# Patient Record
Sex: FEMALE | Race: WHITE | NOT HISPANIC OR LATINO | Employment: PART TIME | URBAN - METROPOLITAN AREA
[De-identification: names, ages, dates, MRNs, and addresses within clinical notes are randomized per-mention and may not be internally consistent; named-entity substitution may affect disease eponyms.]

---

## 2019-09-06 ENCOUNTER — HOSPITAL ENCOUNTER (OUTPATIENT)
Dept: OTHER | Facility: HOSPITAL | Age: 24
Discharge: HOME | End: 2019-09-06
Attending: OBSTETRICS & GYNECOLOGY

## 2021-10-15 ENCOUNTER — TELEPHONE (OUTPATIENT)
Dept: OBSTETRICS AND GYNECOLOGY | Facility: CLINIC | Age: 26
End: 2021-10-15

## 2021-10-20 RX ORDER — SERTRALINE HYDROCHLORIDE 100 MG/1
TABLET, FILM COATED ORAL
Qty: 30 TABLET | Refills: 1 | Status: SHIPPED | OUTPATIENT
Start: 2021-10-20 | End: 2021-11-01

## 2021-10-20 NOTE — TELEPHONE ENCOUNTER
Rx Refill Note  Requested Prescriptions     Pending Prescriptions Disp Refills   • sertraline (ZOLOFT) 100 MG tablet [Pharmacy Med Name: SERTRALINE  MG TABLET] 30 tablet      Sig: TAKE ONE TABLET BY MOUTH DAILY      Last office visit with prescribing clinician: PATIENT LAST SEEN ON 9-30-20 RX GIVEN FOR ZOLOFT 100MG WITH 11 REFILLS @ THAT TIME CHART SCANNED found      Next office visit with prescribing clinician: PAUL 11/1/2021            Padmini Larkin MA  10/20/21, 09:01 EDT

## 2021-11-01 ENCOUNTER — OFFICE VISIT (OUTPATIENT)
Dept: OBSTETRICS AND GYNECOLOGY | Facility: CLINIC | Age: 26
End: 2021-11-01

## 2021-11-01 VITALS
BODY MASS INDEX: 48.9 KG/M2 | WEIGHT: 276 LBS | SYSTOLIC BLOOD PRESSURE: 118 MMHG | HEART RATE: 96 BPM | DIASTOLIC BLOOD PRESSURE: 79 MMHG | HEIGHT: 63 IN

## 2021-11-01 DIAGNOSIS — Z01.419 WOMEN'S ANNUAL ROUTINE GYNECOLOGICAL EXAMINATION: Primary | ICD-10-CM

## 2021-11-01 DIAGNOSIS — E66.01 MORBID OBESITY WITH BMI OF 45.0-49.9, ADULT (HCC): ICD-10-CM

## 2021-11-01 DIAGNOSIS — F32.89 OTHER DEPRESSION: ICD-10-CM

## 2021-11-01 DIAGNOSIS — F52.0 HYPOACTIVE SEXUAL DESIRE: ICD-10-CM

## 2021-11-01 PROBLEM — F32.A DEPRESSION: Status: ACTIVE | Noted: 2021-11-01

## 2021-11-01 PROCEDURE — 99395 PREV VISIT EST AGE 18-39: CPT | Performed by: OBSTETRICS & GYNECOLOGY

## 2021-11-01 PROCEDURE — G0123 SCREEN CERV/VAG THIN LAYER: HCPCS | Performed by: OBSTETRICS & GYNECOLOGY

## 2021-11-01 RX ORDER — SERTRALINE HYDROCHLORIDE 100 MG/1
TABLET, FILM COATED ORAL
COMMUNITY
End: 2021-11-01

## 2021-11-01 RX ORDER — SERTRALINE HYDROCHLORIDE 100 MG/1
100 TABLET, FILM COATED ORAL DAILY
Qty: 30 TABLET | Refills: 12 | Status: SHIPPED | OUTPATIENT
Start: 2021-11-01 | End: 2023-01-10 | Stop reason: SDUPTHER

## 2021-11-01 NOTE — PROGRESS NOTES
"Well Woman Visit    CC: WWE       Tobacco/Nicotine use:  No    HPI:   26 y.o. who presents for a WWE. She reports a monthly menses lasting about 5 days. Moderate flow. Mild dysmenorrhea. Her depression is stable and doing well with the zoloft. She does complain of decreased libido.      History: PMHx, Meds, Allergies, PSHx, Social Hx, and POBHx all reviewed and updated.  PCP: does manage PMHx and preventative labs      /79   Pulse 96   Ht 160 cm (63\")   Wt 125 kg (276 lb)   BMI 48.89 kg/m²     Physical Exam  Vitals and nursing note reviewed. Exam conducted with a chaperone present.   Constitutional:       General: She is not in acute distress.     Appearance: Normal appearance. She is obese. She is not ill-appearing.   HENT:      Head: Normocephalic and atraumatic.      Mouth/Throat:      Mouth: Mucous membranes are moist.      Pharynx: Oropharynx is clear.   Eyes:      Extraocular Movements: Extraocular movements intact.   Neck:      Thyroid: No thyroid mass or thyromegaly.   Cardiovascular:      Rate and Rhythm: Normal rate and regular rhythm.      Heart sounds: Normal heart sounds. No murmur heard.      Pulmonary:      Effort: Pulmonary effort is normal. No respiratory distress.      Breath sounds: Normal breath sounds. No wheezing.   Chest:   Breasts:      Right: No mass, nipple discharge or tenderness.      Left: No mass, nipple discharge or tenderness.       Abdominal:      General: There is no distension.      Palpations: Abdomen is soft. There is no mass.      Tenderness: There is no abdominal tenderness. There is no guarding or rebound.      Hernia: No hernia is present. There is no hernia in the left inguinal area or right inguinal area.   Genitourinary:     General: Normal vulva.      Exam position: Lithotomy position.      Pubic Area: No rash.       Labia:         Right: No rash, tenderness, lesion or injury.         Left: No rash, tenderness, lesion or injury.       Urethra: No " prolapse, urethral pain, urethral swelling or urethral lesion.      Vagina: Normal. No signs of injury and foreign body. No vaginal discharge, erythema, tenderness, bleeding, lesions or prolapsed vaginal walls.      Cervix: Normal.      Uterus: Normal. Not deviated, not enlarged, not fixed, not tender and no uterine prolapse.       Adnexa: Right adnexa normal and left adnexa normal.        Right: No mass, tenderness or fullness.          Left: No mass, tenderness or fullness.        Comments: Exam is limited by body habitus  Musculoskeletal:      Cervical back: Neck supple. No tenderness.   Lymphadenopathy:      Lower Body: No right inguinal adenopathy. No left inguinal adenopathy.   Skin:     General: Skin is warm and dry.   Neurological:      Mental Status: She is alert and oriented to person, place, and time.   Psychiatric:         Mood and Affect: Mood normal.         Behavior: Behavior normal.         Thought Content: Thought content normal.         ASSESSMENT AND PLAN:      Diagnoses and all orders for this visit:    1. Women's annual routine gynecological examination (Primary)  -     IGP,rfx Aptima HPV All Pth    2. Other depression  Assessment & Plan:  Symptoms stable and controlled  Continue zoloft, but this may be contributing to decreased sexual desire  Consider weaning to lowest dose possible    Orders:  -     sertraline (Zoloft) 100 MG tablet; Take 1 tablet by mouth Daily.  Dispense: 30 tablet; Refill: 12    3. Hypoactive sexual desire  Assessment & Plan:  Discussed etiologies  Recommended trying to wean zoloft to lowest dose possible  Discussed pharmacologic treatment options      4. Morbid obesity with BMI of 45.0-49.9, adult ([facility])  Assessment & Plan:  Recommended exercise, diet and weight loss          Preventative:     Recommend FLU vaccine this season, R/B discussed  s/p COVID vaccine      She understands the importance of having any ordered tests to be performed in a timely fashion.  The risks of  not performing them include, but are not limited to, advanced cancer stages, bone loss from osteoporosis and/or subsequent increase in morbidity and/or mortality.  She is encouraged to review her results online and/or contact or office if she has questions.     Follow Up:  Return for Annual physical.      Costa Phillips MD  11/01/2021

## 2021-11-02 NOTE — ASSESSMENT & PLAN NOTE
Discussed etiologies  Recommended trying to wean zoloft to lowest dose possible  Discussed pharmacologic treatment options

## 2021-11-02 NOTE — ASSESSMENT & PLAN NOTE
Symptoms stable and controlled  Continue zoloft, but this may be contributing to decreased sexual desire  Consider weaning to lowest dose possible

## 2021-11-04 LAB
CONV .: NORMAL
CYTOLOGIST CVX/VAG CYTO: NORMAL
CYTOLOGY CVX/VAG DOC CYTO: NORMAL
CYTOLOGY CVX/VAG DOC THIN PREP: NORMAL
DX ICD CODE: NORMAL
HIV 1 & 2 AB SER-IMP: NORMAL
OTHER STN SPEC: NORMAL
STAT OF ADQ CVX/VAG CYTO-IMP: NORMAL

## 2022-12-30 DIAGNOSIS — F32.89 OTHER DEPRESSION: ICD-10-CM

## 2023-01-03 RX ORDER — SERTRALINE HYDROCHLORIDE 100 MG/1
TABLET, FILM COATED ORAL
Qty: 30 TABLET | Refills: 0 | OUTPATIENT
Start: 2023-01-03

## 2023-01-03 NOTE — TELEPHONE ENCOUNTER
Rx Refill Note  Requested Prescriptions     Pending Prescriptions Disp Refills   • sertraline (ZOLOFT) 100 MG tablet [Pharmacy Med Name: SERTRALINE  MG TABLET] 60 tablet      Sig: TAKE ONE TABLET BY MOUTH DAILY      Last seen 11-1-21.  Last filled 11-1-21 Zoloft # 30 with 12 refills  No appointment scheduled                         Would you like a call back once the refill request has been completed: [] Yes [] No    If the office needs to give you a call back, can they leave a voicemail: [] Yes [] No    Padmini Larkin MA  01/03/23, 09:17 EST

## 2023-01-10 DIAGNOSIS — F32.89 OTHER DEPRESSION: ICD-10-CM

## 2023-01-10 RX ORDER — SERTRALINE HYDROCHLORIDE 100 MG/1
100 TABLET, FILM COATED ORAL DAILY
Qty: 30 TABLET | Refills: 0 | Status: SHIPPED | OUTPATIENT
Start: 2023-01-10 | End: 2023-01-31

## 2023-01-10 NOTE — TELEPHONE ENCOUNTER
Rx Refill Note  Requested Prescriptions     Pending Prescriptions Disp Refills   • sertraline (Zoloft) 100 MG tablet 30 tablet 12     Sig: Take 1 tablet by mouth Daily.      Last seen 11-1-21.  Last filled 11-1-21 Zoloft #30 with 12 refills     Next appointment 1-30-23                          Would you like a call back once the refill request has been completed: [] Yes [] No    If the office needs to give you a call back, can they leave a voicemail: [] Yes [] No    Padmini Larkin MA  01/10/23, 11:16 EST

## 2023-01-26 ENCOUNTER — TELEPHONE (OUTPATIENT)
Dept: OBSTETRICS AND GYNECOLOGY | Facility: CLINIC | Age: 28
End: 2023-01-26
Payer: COMMERCIAL

## 2023-01-26 DIAGNOSIS — F32.89 OTHER DEPRESSION: ICD-10-CM

## 2023-01-26 NOTE — TELEPHONE ENCOUNTER
Provider: DR. GTZ  Caller: =CASEY LÓPEZ  Relationship to Patient: SELF  Pharmacy: [facility] @ [address][phone]  Phone Number: [phone]  Reason for Call: PT WILL BE OUT OF HER ZOLOFT ON 2-10. HAD TO R/S APPT TO 2-28 THAT SHE HAD ON 1/26 DUE TO PROVIDER BEING OUT OF OFFICE.  COULD SHE PLS GET A REFILL TO HOLD HER UNTIL HER 2-28 APPT?  ANY QUESTIONS PLS CONTACT HER    When was the patient last seen: 11-1-21

## 2023-01-30 RX ORDER — SERTRALINE HYDROCHLORIDE 100 MG/1
100 TABLET, FILM COATED ORAL DAILY
Qty: 30 TABLET | Refills: 0 | Status: CANCELLED | OUTPATIENT
Start: 2023-01-30

## 2023-01-30 NOTE — TELEPHONE ENCOUNTER
Patient talked to someone @ [facility].  I have attached her note.  She is requesting a refill on her Zoloft.  Last filled 1/10/23 Zoloft #30 with no refills.  Last seen 11/1/21.  Next appointment 2/28/23

## 2023-01-31 DIAGNOSIS — F33.0 MILD EPISODE OF RECURRENT MAJOR DEPRESSIVE DISORDER: Primary | ICD-10-CM

## 2023-01-31 RX ORDER — SERTRALINE HYDROCHLORIDE 100 MG/1
100 TABLET, FILM COATED ORAL DAILY
Qty: 90 TABLET | Refills: 3 | Status: SHIPPED | OUTPATIENT
Start: 2023-01-31

## 2023-02-28 ENCOUNTER — OFFICE VISIT (OUTPATIENT)
Dept: OBSTETRICS AND GYNECOLOGY | Facility: CLINIC | Age: 28
End: 2023-02-28
Payer: COMMERCIAL

## 2023-02-28 VITALS
BODY MASS INDEX: 48.18 KG/M2 | HEART RATE: 97 BPM | SYSTOLIC BLOOD PRESSURE: 104 MMHG | DIASTOLIC BLOOD PRESSURE: 72 MMHG | WEIGHT: 272 LBS

## 2023-02-28 DIAGNOSIS — L68.0 FEMALE HIRSUTISM: ICD-10-CM

## 2023-02-28 DIAGNOSIS — Z01.419 WELL WOMAN EXAM WITH ROUTINE GYNECOLOGICAL EXAM: Primary | ICD-10-CM

## 2023-02-28 PROBLEM — F52.0 HYPOACTIVE SEXUAL DESIRE: Status: RESOLVED | Noted: 2021-11-01 | Resolved: 2023-02-28

## 2023-02-28 LAB
ESTRADIOL SERPL HS-MCNC: 48 PG/ML
T4 FREE SERPL-MCNC: 1.21 NG/DL (ref 0.93–1.7)
TESTOST SERPL-MCNC: 40.2 NG/DL (ref 8.4–48.1)
TSH SERPL DL<=0.05 MIU/L-ACNC: 1.45 UIU/ML (ref 0.27–4.2)

## 2023-02-28 PROCEDURE — 84410 TESTOSTERONE BIOAVAILABLE: CPT | Performed by: OBSTETRICS & GYNECOLOGY

## 2023-02-28 PROCEDURE — 84439 ASSAY OF FREE THYROXINE: CPT | Performed by: OBSTETRICS & GYNECOLOGY

## 2023-02-28 PROCEDURE — G0123 SCREEN CERV/VAG THIN LAYER: HCPCS | Performed by: OBSTETRICS & GYNECOLOGY

## 2023-02-28 PROCEDURE — 84403 ASSAY OF TOTAL TESTOSTERONE: CPT | Performed by: OBSTETRICS & GYNECOLOGY

## 2023-02-28 PROCEDURE — 84443 ASSAY THYROID STIM HORMONE: CPT | Performed by: OBSTETRICS & GYNECOLOGY

## 2023-02-28 PROCEDURE — 82670 ASSAY OF TOTAL ESTRADIOL: CPT | Performed by: OBSTETRICS & GYNECOLOGY

## 2023-02-28 PROCEDURE — 99395 PREV VISIT EST AGE 18-39: CPT | Performed by: OBSTETRICS & GYNECOLOGY

## 2023-02-28 RX ORDER — SPIRONOLACTONE 50 MG/1
50 TABLET, FILM COATED ORAL DAILY
Qty: 30 TABLET | Refills: 1 | Status: SHIPPED | OUTPATIENT
Start: 2023-02-28

## 2023-02-28 NOTE — PROGRESS NOTES
Well Woman Visit    CC: PAUL    HPI:   27 y.o. who presents for a well woman exam. Menses is regular.  Has noticed that her menses has changed.  Last about 5 to 6 days.  Has a couple of days in the middle where she does not have bleeding and then has spotting afterwards.  Also complains of excess facial hair growth and increased sweating since delivery of her last child.  No other problems.    History: PMHx, Meds, Allergies, PSHx, Social Hx, and POBHx all reviewed and updated.    /72   Pulse 97   Wt 123 kg (272 lb)   LMP 2023   Breastfeeding No   BMI 48.18 kg/m²     Physical Exam  Vitals and nursing note reviewed. Exam conducted with a chaperone present.   Constitutional:       General: She is not in acute distress.     Appearance: Normal appearance. She is obese. She is not ill-appearing.   HENT:      Head: Normocephalic and atraumatic.   Eyes:      Extraocular Movements: Extraocular movements intact.   Neck:      Thyroid: No thyroid mass or thyromegaly.   Chest:   Breasts:     Breasts are symmetrical.      Right: Normal. No swelling, bleeding, inverted nipple, mass, nipple discharge, skin change or tenderness.      Left: Normal. No swelling, bleeding, inverted nipple, mass, nipple discharge, skin change or tenderness.   Abdominal:      General: Abdomen is flat. There is no distension.      Palpations: Abdomen is soft. There is no mass.      Tenderness: There is no abdominal tenderness. There is no guarding or rebound.      Hernia: No hernia is present. There is no hernia in the left inguinal area or right inguinal area.   Genitourinary:     General: Normal vulva.      Exam position: Knee-chest position.      Pubic Area: No rash.       Labia:         Right: No rash or lesion.         Left: No rash or lesion.       Urethra: No prolapse, urethral pain, urethral swelling or urethral lesion.      Vagina: Normal. No vaginal discharge, tenderness or prolapsed vaginal walls.      Cervix: Normal.       Uterus: Normal.       Adnexa: Right adnexa normal and left adnexa normal.      Comments: The patient's exam is limited by body habitus  Musculoskeletal:      Right lower leg: No edema.      Left lower leg: No edema.   Lymphadenopathy:      Upper Body:      Right upper body: No supraclavicular or axillary adenopathy.      Left upper body: No supraclavicular or axillary adenopathy.   Skin:     General: Skin is warm and dry.      Findings: No rash.   Neurological:      Mental Status: She is alert and oriented to person, place, and time.   Psychiatric:         Mood and Affect: Mood normal.         Behavior: Behavior normal.         Thought Content: Thought content normal.         Judgment: Judgment normal.         ASSESSMENT AND PLAN:    Diagnoses and all orders for this visit:    1. Well woman exam with routine gynecological exam (Primary)  Assessment & Plan:  Pap  Commend daily multivitamin with folic acid    Orders:  -     IGP,rfx Aptima HPV All Pth    2. Female hirsutism  -     Testosterone, Bioavailable (M)  -     Testosterone - total; Future  -     TSH  -     T4, free  -     Estradiol  -     spironolactone (Aldactone) 50 MG tablet; Take 1 tablet by mouth Daily.  Dispense: 30 tablet; Refill: 1  -     Testosterone - total      Counseling:     Track menses, RTO IF <q21d, >7d long, or heavy    Preventative:   Recommend FLU vaccine this season, R/B discussed  s/p COVID vaccine      She understands the importance of having any ordered tests to be performed in a timely fashion.  The risks of not performing them include, but are not limited to, advanced cancer stages, bone loss from osteoporosis and/or subsequent increase in morbidity and/or mortality.  She is encouraged to review her results online and/or contact or office if she has questions.     Follow Up:  Return in about 4 weeks (around 3/28/2023) for Recheck.    Costa Phillips MD  02/28/2023

## 2023-03-01 ENCOUNTER — TELEPHONE (OUTPATIENT)
Dept: OBSTETRICS AND GYNECOLOGY | Facility: CLINIC | Age: 28
End: 2023-03-01
Payer: COMMERCIAL

## 2023-03-01 NOTE — TELEPHONE ENCOUNTER
----- Message from Costa Phillips MD sent at 3/1/2023  7:47 AM EST -----  These inform the patient the labs that have returned so far are normal.  I am still waiting for the testosterone levels.

## 2023-03-09 LAB
TESTOST SERPL-MCNC: 28 NG/DL
TESTOSTERONE.FREE+WB MFR SERPL: 40.6 %
TESTOSTERONE.FREE+WB SERPL-MCNC: 11 NG/DL

## 2023-03-28 ENCOUNTER — TELEPHONE (OUTPATIENT)
Dept: OBSTETRICS AND GYNECOLOGY | Facility: CLINIC | Age: 28
End: 2023-03-28

## 2023-03-28 NOTE — TELEPHONE ENCOUNTER
DELETE AFTER REVIEWING: Telephone encounter to be sent to the clinical pool     Caller: Karma Arriola    Relationship to patient: Self    Best call back number: [phone]    PT WAS SAME DAY CANCEL TODAY DUE TO ILLNESS.  WE RESCHEDULED 1ST AVAIL ON 5-9, THIS APPT IS FOR A MED RECHECK (4 WK FROM 2-28)). SHE STATED THE MED IS FINE BUT THEY WERE GOING TO DISCUSS POSS INCREASING THE DOSAGE.   CAN CALL PT TO R/S IF NEEDED. CAN CALL HER ANYTIME AND CAN LVM IF NA.

## 2023-05-09 ENCOUNTER — OFFICE VISIT (OUTPATIENT)
Dept: OBSTETRICS AND GYNECOLOGY | Facility: CLINIC | Age: 28
End: 2023-05-09
Payer: COMMERCIAL

## 2023-05-09 VITALS
BODY MASS INDEX: 49.07 KG/M2 | WEIGHT: 277 LBS | DIASTOLIC BLOOD PRESSURE: 75 MMHG | SYSTOLIC BLOOD PRESSURE: 109 MMHG | HEART RATE: 84 BPM

## 2023-05-09 DIAGNOSIS — R10.2 PELVIC PAIN: ICD-10-CM

## 2023-05-09 DIAGNOSIS — L68.0 FEMALE HIRSUTISM: Primary | ICD-10-CM

## 2023-05-09 PROBLEM — Z01.419 WELL WOMAN EXAM WITH ROUTINE GYNECOLOGICAL EXAM: Status: RESOLVED | Noted: 2023-02-28 | Resolved: 2023-05-09

## 2023-05-09 PROCEDURE — 99213 OFFICE O/P EST LOW 20 MIN: CPT | Performed by: OBSTETRICS & GYNECOLOGY

## 2023-05-09 RX ORDER — SPIRONOLACTONE 100 MG/1
100 TABLET, FILM COATED ORAL DAILY
Qty: 30 TABLET | Refills: 3 | Status: SHIPPED | OUTPATIENT
Start: 2023-05-09

## 2023-05-09 NOTE — PROGRESS NOTES
GYN Visit    CC: Follow-up meds    HPI:   27 y.o. who presents in follow-up of spironolactone for hirsutism.  The patient's had no medication problems.  She had no adverse side effects.  She has not noticed any significant new hair growth.  She wishes to continue with spironolactone.  Patient also notes that she has had 3 years of off-and-on right lower pelvic pain.  The pain occurs randomly.  Not related to her menses.  Sometimes is months between episodes sometimes it occurs several times in a week.  Usually last a few minutes to couple of hours and then will resolve.  Started after her last pregnancy.    History: PMHx, Meds, Allergies, PSHx, Social Hx, and POBHx all reviewed and updated.    /75   Pulse 84   Wt 126 kg (277 lb)   LMP 2023 Comment: Cycle ended on the  23, normal flow  Breastfeeding No   BMI 49.07 kg/m²     Physical Exam  Vitals and nursing note reviewed. Exam conducted with a chaperone present.   Constitutional:       General: She is not in acute distress.     Appearance: Normal appearance. She is obese. She is not ill-appearing.   Musculoskeletal:         General: No swelling.      Right lower leg: No edema.      Left lower leg: No edema.   Neurological:      Mental Status: She is alert and oriented to person, place, and time.   Psychiatric:         Mood and Affect: Mood normal.         Behavior: Behavior normal.         Thought Content: Thought content normal.         Judgment: Judgment normal.           ASSESSMENT AND PLAN:  Diagnoses and all orders for this visit:    1. Female hirsutism (Primary)  Assessment & Plan:  BPs seems to be tolerating spironolactone well.  Increase to 100 mg daily.    Orders:  -     spironolactone (Aldactone) 100 MG tablet; Take 1 tablet by mouth Daily.  Dispense: 30 tablet; Refill: 3    2. Pelvic pain  Assessment & Plan:  Check pelvic ultrasound    Orders:  -     Cancel: US Pelvis Transvaginal Non OB; Future  -     US Pelvis Transvaginal Non  OB; Future      Follow Up:  Return in about 4 weeks (around 6/6/2023) for Recheck.    Costa Phillips MD  05/09/2023

## 2023-06-07 ENCOUNTER — OFFICE VISIT (OUTPATIENT)
Dept: OBSTETRICS AND GYNECOLOGY | Facility: CLINIC | Age: 28
End: 2023-06-07
Payer: COMMERCIAL

## 2023-06-07 VITALS
HEART RATE: 80 BPM | SYSTOLIC BLOOD PRESSURE: 102 MMHG | DIASTOLIC BLOOD PRESSURE: 54 MMHG | WEIGHT: 275 LBS | HEIGHT: 63 IN | BODY MASS INDEX: 48.73 KG/M2

## 2023-06-07 DIAGNOSIS — E66.01 MORBID OBESITY WITH BMI OF 45.0-49.9, ADULT: ICD-10-CM

## 2023-06-07 DIAGNOSIS — L68.0 FEMALE HIRSUTISM: ICD-10-CM

## 2023-06-07 DIAGNOSIS — R10.2 PELVIC PAIN: Primary | ICD-10-CM

## 2023-06-07 PROCEDURE — 99213 OFFICE O/P EST LOW 20 MIN: CPT | Performed by: OBSTETRICS & GYNECOLOGY

## 2023-06-07 NOTE — ASSESSMENT & PLAN NOTE
The patient's pelvic ultrasound is essentially normal.  There are some immature follicles noted on the ultrasound but not enough that actually meets PCOS criteria.  The pattern is suspicious for PCOS particular given the patient's issues with hirsutism.  The patient does have regular cycles however.  In regards the patient's pelvic pain there is no significant findings.  There is possibly a small amount of pelvic congestion however it is uncertain if this is clinically relatable or to the patient's symptoms or not.  The patient really does not have chronic pain more of intermittent acute episodes of pain.  Patient wishes to monitor symptoms at this time.  She can control her symptoms with NSAIDs and then return if necessary.

## 2023-06-07 NOTE — ASSESSMENT & PLAN NOTE
BP is 102/54.  I do not think we can titrate the spironolactone any further.  We will leave the dose at 100 mg.  She will continue the 100 mg daily.

## 2023-06-07 NOTE — PROGRESS NOTES
"GYN Visit    CC: Follow-up ultrasound    HPI:   27 y.o. who presents in follow-up of an ultrasound in regards to pelvic pain.  The patient reports that her pain is intermittent.  Sharp and only lasted a short period of time.  She is wanted to be sure there was not anything significant going on.  She is doing well with the spironolactone.  She is having no problems.      History: PMHx, Meds, Allergies, PSHx, Social Hx, and POBHx all reviewed and updated.    /54   Pulse 80   Ht 160 cm (63\")   Wt 125 kg (275 lb)   BMI 48.71 kg/m²     Physical Exam  Vitals and nursing note reviewed. Exam conducted with a chaperone present.   Constitutional:       General: She is not in acute distress.     Appearance: Normal appearance. She is not ill-appearing.   Neurological:      Mental Status: She is alert and oriented to person, place, and time.   Psychiatric:         Mood and Affect: Mood normal.         Behavior: Behavior normal.         Thought Content: Thought content normal.         Judgment: Judgment normal.         ASSESSMENT AND PLAN:  Diagnoses and all orders for this visit:    1. Pelvic pain (Primary)  Assessment & Plan:  The patient's pelvic ultrasound is essentially normal.  There are some immature follicles noted on the ultrasound but not enough that actually meets PCOS criteria.  The pattern is suspicious for PCOS particular given the patient's issues with hirsutism.  The patient does have regular cycles however.  In regards the patient's pelvic pain there is no significant findings.  There is possibly a small amount of pelvic congestion however it is uncertain if this is clinically relatable or to the patient's symptoms or not.  The patient really does not have chronic pain more of intermittent acute episodes of pain.  Patient wishes to monitor symptoms at this time.  She can control her symptoms with NSAIDs and then return if necessary.      2. Morbid obesity with BMI of 45.0-49.9, adult  Assessment " & Plan:  Discussed exercise, nutrition and recommended weight loss.      3. Female hirsutism  Assessment & Plan:  BP is 102/54.  I do not think we can titrate the spironolactone any further.  We will leave the dose at 100 mg.  She will continue the 100 mg daily.      Follow Up:  Return for Annual physical.      Costa Phillips MD  06/07/2023

## 2024-02-27 DIAGNOSIS — F33.0 MILD EPISODE OF RECURRENT MAJOR DEPRESSIVE DISORDER: ICD-10-CM

## 2024-02-27 RX ORDER — SERTRALINE HYDROCHLORIDE 100 MG/1
100 TABLET, FILM COATED ORAL DAILY
Qty: 90 TABLET | Refills: 3 | Status: SHIPPED | OUTPATIENT
Start: 2024-02-27

## 2024-02-27 NOTE — TELEPHONE ENCOUNTER
Pharmacy requesting refill on Zoloft 100mg.  Last seen 2/23/23 annual.  Last filled 1/31/23 Zoloft #90 with 3 refills.  Next appointment 3/4/24

## 2024-03-04 ENCOUNTER — OFFICE VISIT (OUTPATIENT)
Dept: OBSTETRICS AND GYNECOLOGY | Facility: CLINIC | Age: 29
End: 2024-03-04
Payer: COMMERCIAL

## 2024-03-04 VITALS
SYSTOLIC BLOOD PRESSURE: 99 MMHG | WEIGHT: 276 LBS | DIASTOLIC BLOOD PRESSURE: 69 MMHG | BODY MASS INDEX: 48.9 KG/M2 | HEIGHT: 63 IN | HEART RATE: 92 BPM

## 2024-03-04 DIAGNOSIS — F32.89 OTHER DEPRESSION: ICD-10-CM

## 2024-03-04 DIAGNOSIS — Z01.419 WELL WOMAN EXAM WITH ROUTINE GYNECOLOGICAL EXAM: Primary | ICD-10-CM

## 2024-03-04 PROBLEM — R10.2 PELVIC PAIN: Status: RESOLVED | Noted: 2023-05-09 | Resolved: 2024-03-04

## 2024-03-04 PROCEDURE — G0123 SCREEN CERV/VAG THIN LAYER: HCPCS | Performed by: OBSTETRICS & GYNECOLOGY

## 2024-03-04 PROCEDURE — 99395 PREV VISIT EST AGE 18-39: CPT | Performed by: OBSTETRICS & GYNECOLOGY

## 2024-03-04 NOTE — PROGRESS NOTES
"Well Woman Visit    CC: PAUL     HPI:   28 y.o. who presents for a well woman exam.  Patient has no complaints or concerns today.  She reports her menstrual cycles are fairly regular.  Her partner had a vasectomy for contraception.  She is no longer using the spironolactone.  She is still taking the Zoloft 100 mg daily and doing well with this.    History: PMHx, Meds, Allergies, PSHx, Social Hx, and POBHx all reviewed and updated.      BP 99/69   Pulse 92   Ht 160 cm (63\")   Wt 125 kg (276 lb)   LMP 2024   Breastfeeding No   BMI 48.89 kg/m²     Physical Exam  Vitals and nursing note reviewed. Exam conducted with a chaperone present.   Constitutional:       General: She is not in acute distress.     Appearance: Normal appearance. She is obese. She is not ill-appearing.   HENT:      Head: Normocephalic and atraumatic.   Neck:      Thyroid: No thyroid mass or thyromegaly.   Chest:   Breasts:     Breasts are symmetrical.      Right: Normal. No swelling, bleeding, inverted nipple, mass, nipple discharge, skin change or tenderness.      Left: Normal. No swelling, bleeding, inverted nipple, mass, nipple discharge, skin change or tenderness.   Abdominal:      General: There is no distension.      Palpations: Abdomen is soft. There is no mass.      Tenderness: There is no abdominal tenderness.      Hernia: There is no hernia in the right inguinal area.   Genitourinary:     General: Normal vulva.      Exam position: Lithotomy position.      Pubic Area: No rash.       Labia:         Right: No rash, tenderness, lesion or injury.         Left: No rash, tenderness, lesion or injury.       Urethra: No prolapse, urethral pain or urethral lesion.      Vagina: Normal. No vaginal discharge, erythema, tenderness, bleeding or prolapsed vaginal walls.      Cervix: Normal.      Uterus: Normal.       Adnexa:         Right: No mass or tenderness.          Left: No mass or tenderness.     Lymphadenopathy:      Upper Body: "      Right upper body: No supraclavicular or axillary adenopathy.      Left upper body: No supraclavicular or axillary adenopathy.   Skin:     General: Skin is warm and dry.   Neurological:      Mental Status: She is alert and oriented to person, place, and time.   Psychiatric:         Mood and Affect: Mood normal.         Behavior: Behavior normal.         Thought Content: Thought content normal.         ASSESSMENT AND PLAN:    Diagnoses and all orders for this visit:    1. Well woman exam with routine gynecological exam (Primary)  Assessment & Plan:  Pap  Recommend daily multivitamin with folic acid    Orders:  -     IGP,rfx Aptima HPV All Pth    2. Other depression  Assessment & Plan:  Stable.  Continue Zoloft 100 mg p.o. daily          Counseling:     Track menses, RTO IF <q21d, >7d long, or heavy    Preventative:   Recommend FLU vaccine this season, R/B discussed  s/p COVID vaccine  COVID booster recommended    She understands the importance of having any ordered tests to be performed in a timely fashion.  The risks of not performing them include, but are not limited to, advanced cancer stages, bone loss from osteoporosis and/or subsequent increase in morbidity and/or mortality.  She is encouraged to review her results online and/or contact or office if she has questions.     Follow Up:  Return for Annual physical.    Costa Phillips MD  03/04/2024

## 2024-03-11 LAB
CONV .: NORMAL
CYTOLOGIST CVX/VAG CYTO: NORMAL
CYTOLOGY CVX/VAG DOC CYTO: NORMAL
CYTOLOGY CVX/VAG DOC THIN PREP: NORMAL
DX ICD CODE: NORMAL
Lab: NORMAL
OTHER STN SPEC: NORMAL
PATHOLOGIST CVX/VAG CYTO: NORMAL
STAT OF ADQ CVX/VAG CYTO-IMP: NORMAL

## 2025-03-10 ENCOUNTER — OFFICE VISIT (OUTPATIENT)
Dept: OBSTETRICS AND GYNECOLOGY | Facility: CLINIC | Age: 30
End: 2025-03-10
Payer: COMMERCIAL

## 2025-03-10 VITALS
SYSTOLIC BLOOD PRESSURE: 99 MMHG | WEIGHT: 282 LBS | BODY MASS INDEX: 49.96 KG/M2 | HEIGHT: 63 IN | DIASTOLIC BLOOD PRESSURE: 64 MMHG | HEART RATE: 81 BPM

## 2025-03-10 DIAGNOSIS — Z01.419 WELL WOMAN EXAM WITH ROUTINE GYNECOLOGICAL EXAM: Primary | ICD-10-CM

## 2025-03-10 DIAGNOSIS — Z30.09 ENCOUNTER FOR OTHER GENERAL COUNSELING OR ADVICE ON CONTRACEPTION: ICD-10-CM

## 2025-03-10 PROBLEM — Z30.9 CONTRACEPTIVE MANAGEMENT: Status: ACTIVE | Noted: 2025-03-10

## 2025-03-10 PROCEDURE — G0123 SCREEN CERV/VAG THIN LAYER: HCPCS | Performed by: OBSTETRICS & GYNECOLOGY

## 2025-03-10 RX ORDER — PANTOPRAZOLE SODIUM 40 MG/1
TABLET, DELAYED RELEASE ORAL
COMMUNITY
Start: 2024-11-01

## 2025-03-10 RX ORDER — ESCITALOPRAM OXALATE 20 MG/1
TABLET ORAL
COMMUNITY
Start: 2024-08-01

## 2025-03-10 NOTE — PROGRESS NOTES
"Well Woman Visit    CC: Well woman visit    Subjective:   29 y.o. who presents for a well woman exam.  The patient has no complaints.  She is interested in potentially using a backup form of contraception in addition to her partner's vasectomy.  Her menses are monthly.  They last about 5 to 7 days with moderate flow.    Last PAP:   Last Completed Pap Smear            Awaiting Completion       PAP SMEAR (Every 3 Years) Order placed this encounter      03/10/2025  Order placed for IGP,rfx Aptima HPV All Pth by Costa Phillips MD    2024  IGP,rfx Aptima HPV All Pth    2023  IGP,rfx Aptima HPV All Pth    2021  IGP,rfx Aptima HPV All Pth                          Last MMG:   Last Completed Mammogram    This patient has no relevant Health Maintenance data.          History: PMHx, Meds, Allergies, PSHx, Social Hx, and POBHx all reviewed and updated.    BP 99/64   Pulse 81   Ht 160 cm (63\")   Wt 128 kg (282 lb)   LMP 2025   Breastfeeding No   BMI 49.95 kg/m²     Physical Exam  Vitals and nursing note reviewed. Exam conducted with a chaperone present.   Constitutional:       General: She is not in acute distress.     Appearance: Normal appearance. She is not ill-appearing.   HENT:      Head: Normocephalic and atraumatic.      Mouth/Throat:      Mouth: Mucous membranes are moist.      Pharynx: Oropharynx is clear.   Eyes:      Extraocular Movements: Extraocular movements intact.   Neck:      Thyroid: No thyroid mass or thyromegaly.   Chest:   Breasts:     Breasts are symmetrical.      Right: Normal. No swelling, bleeding, inverted nipple, mass, nipple discharge, skin change or tenderness.      Left: Normal. No swelling, bleeding, inverted nipple, mass, nipple discharge, skin change or tenderness.   Abdominal:      General: There is no distension.      Palpations: Abdomen is soft. There is no mass.      Tenderness: There is no abdominal tenderness.      Hernia: There is no hernia in " the left inguinal area or right inguinal area.   Genitourinary:     General: Normal vulva.      Exam position: Lithotomy position.      Pubic Area: No rash.       Labia:         Right: No rash, tenderness, lesion or injury.         Left: No rash, tenderness, lesion or injury.       Urethra: No prolapse, urethral pain or urethral lesion.      Vagina: Normal. No vaginal discharge, erythema, tenderness, bleeding or prolapsed vaginal walls.      Cervix: Normal. No friability, lesion, erythema or cervical bleeding.      Uterus: Normal. Not enlarged, not fixed, not tender and no uterine prolapse.       Adnexa:         Right: No mass or tenderness.          Left: No mass or tenderness.        Comments: Exam is limited by habitus  Musculoskeletal:         General: No swelling.      Right lower leg: No edema.      Left lower leg: No edema.   Lymphadenopathy:      Upper Body:      Right upper body: No supraclavicular or axillary adenopathy.      Left upper body: No supraclavicular or axillary adenopathy.   Skin:     General: Skin is warm and dry.      Findings: No rash.   Neurological:      Mental Status: She is alert and oriented to person, place, and time.   Psychiatric:         Mood and Affect: Mood normal.         Behavior: Behavior normal.         Thought Content: Thought content normal.         Assessment and Plan:  Diagnoses and all orders for this visit:    1. Well woman exam with routine gynecological exam (Primary)  Assessment & Plan:  Pap  Recommend daily multivitamin with folic acid    Orders:  -     IGP,rfx Aptima HPV All Pth    2. Encounter for other general counseling or advice on contraception  Assessment & Plan:  The patient's partner has had a vasectomy, however she still has concerns about the possibility of failure of that form of contraception.  The patient is interested in utilizing a second form of contraception.  She does not do well with daily medication and is interested in an IUD.  The risk,  benefits, and alternatives of IUD contraceptives were discussed.  Handouts were provided on each of the different available IUDs.  If the patient desires IUD placement she can call back to have the IUD insertion scheduled.  She will not need a beta-hCG today prior since her partner has had a vasectomy.  I would recommend this be placed on her menstrual cycle if possible and she will need a urine hCG the day of insertion.  I have also recommended ibuprofen or Tylenol 30 minutes prior to the insertion.          Counseling:     All BC Options R/B/A/SE/E of each reviewed  Track menses, RTO IF <q21d, >7d long, or heavy    Preventative:   Recommend FLU vaccine this season, R/B discussed  s/p COVID vaccine    She understands the importance of having any ordered tests to be performed in a timely fashion.  The risks of not performing them include, but are not limited to, advanced cancer stages, bone loss from osteoporosis and/or subsequent increase in morbidity and/or mortality.  She is encouraged to review her results online and/or contact or office if she has questions.     Follow Up:  Return for Annual physical.    Costa Phillips MD  03/10/2025

## 2025-03-10 NOTE — ASSESSMENT & PLAN NOTE
The patient's partner has had a vasectomy, however she still has concerns about the possibility of failure of that form of contraception.  The patient is interested in utilizing a second form of contraception.  She does not do well with daily medication and is interested in an IUD.  The risk, benefits, and alternatives of IUD contraceptives were discussed.  Handouts were provided on each of the different available IUDs.  If the patient desires IUD placement she can call back to have the IUD insertion scheduled.  She will not need a beta-hCG today prior since her partner has had a vasectomy.  I would recommend this be placed on her menstrual cycle if possible and she will need a urine hCG the day of insertion.  I have also recommended ibuprofen or Tylenol 30 minutes prior to the insertion.

## 2025-03-17 LAB
CONV .: NORMAL
CYTOLOGIST CVX/VAG CYTO: NORMAL
CYTOLOGY CVX/VAG DOC CYTO: NORMAL
CYTOLOGY CVX/VAG DOC THIN PREP: NORMAL
DX ICD CODE: NORMAL
Lab: NORMAL
OTHER STN SPEC: NORMAL
SERVICE CMNT-IMP: NORMAL
STAT OF ADQ CVX/VAG CYTO-IMP: NORMAL